# Patient Record
(demographics unavailable — no encounter records)

---
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
## 2025-03-29 NOTE — IMAGING
[Right] : right shoulder [Fracture] : Fracture [FreeTextEntry3] : displaced midshaft clavicle fracture

## 2025-03-29 NOTE — DISCUSSION/SUMMARY
[de-identified] : General Dx Discussion The patient was advised of the diagnosis. The natural history of the pathology was explained in full to the patient in layman's terms. All questions were answered. The risks and benefits of surgical and non-surgical treatment alternatives were explained in full to the patient.  x-rays findings reviewed with patient and her parents. Advised surgery is recommended. She will continue in sling at all times.  Tylenol as needed for pain.  She will see Dr. Gamble this monday to discuss and schedule surgery. Advised if skin breaks open she should go directly to the ER for further evaluation.  All questions answered and they had a clear understanding.

## 2025-03-29 NOTE — PHYSICAL EXAM
[Right] : right shoulder [] : tenderness at clavicle fracture site [FreeTextEntry3] : No Skin Tenting/opening [FreeTextEntry8] : NVI

## 2025-03-31 NOTE — DISCUSSION/SUMMARY
[de-identified] : Plan: Patient understands that She is a candidate for acute intervention to repair a RIGHT CLAVICLE FRACTURE in form of RIGHT CLAVICLE ORIF . Discussed the risks and benefits of the procedure. Discussed non-operative and operative treatment options. All questions and concerns regarding the surgery were addressed. Went over the recovery timeline and expected outcomes following surgery. Patient elected to move forward with the surgical procedure.  WILL OBTAIN STAT CT CHEST TO EVAL FOR PNEUMOTHORAX PREOP  Consent:  Conservative treatment, nontreatment, nonsurgical intervention and surgical intervention treatment options have been reviewed with the patient.  The patient continues to be symptomatic and has failed conservative treatment, and elects to move forward with surgical intervention.  The patient is indicated for [the above procedure] and all indicated procedures. As such the alternatives, benefits and risks, of the above procedure, including but not limited to bleeding, infection, neurovascular injury, loss of limb, loss of life,  DVT, PE, RSD, inability to return to previous level of activity, inability to return to previous level of employment, advancement of or to osteoarthritic changes, joint instability or motion loss, hardware failure or migration, malunion or nonunion, failure to resolve all symptoms, failure to return to sports and need for further procedures were discussed with the patient and/or their legal guardian who agreed to move forward with surgical intervention.  They have reviewed and signed the consent form today after expressing understanding of the above documented conversation. The patient or their representative will contact my office as instructed on the preoperative instruction sheet they received today to schedule surgery in a timely manner as discussed.    Tests Ordered: Pre-op and medical clearance Follow-Up: 2 weeks post-op   The patient's current medication management of their orthopedic diagnosis was reviewed today: (1) We discussed a comprehensive treatment plan that included possible pharmaceutical management involving the use of prescription strength medications including but not limited to options such as oral Naprosyn 500mg BID, once daily Meloxicam 15 mg, or 500-650 mg Tylenol versus over the counter oral medications and topical prescription NSAID Pennsaid vs over the counter Voltaren gel.   (2) There is a moderate risk of morbidity with further treatment, especially from use of prescription strength medications and possible side effects of these medications which include upset stomach with oral medications, skin reactions to topical medications and cardiac/renal issues with long term use.   (3) I recommended that the patient follow-up with their medical physician to discuss any significant specific potential issues with long term medication use such as interactions with current medications or with exacerbation of underlying medical comorbidities.   (4) The benefits and risks associated with use of injectable, oral or topical, prescription and over the counter anti-inflammatory medications were discussed with the patient. The patient voiced understanding of the risks including but not limited to bleeding, stroke, kidney dysfunction, heart disease, and were referred to the black box warning label for further information.
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
## 2025-03-31 NOTE — IMAGING
[de-identified] : No erythema, no discharge, no increased warmth to touch. ROM deferred due to fx status, strength testing deferred due to fx status. Distally neurovascularly intact with median, radial, ulnar, MSC, axillary nerves intact. 2+ radial pulse with capillary refill <2 seconds. Able to make fist, oppose thumb to small finger and abduct fingers. Sensation intact in median, ulnar and superficial radial.   X-rays of the RIGHT CLAVICLE show >100% displacement midshaft fracture with shortening

## 2025-04-10 NOTE — PHYSICAL EXAM
[de-identified] : No erythema, no discharge, no increased warmth to touch. ROM deferred due to fx status, strength testing deferred due to fx status. Distally neurovascularly intact with median, radial, ulnar, MSC, axillary nerves intact. 2+ radial pulse with capillary refill <2 seconds. Able to make fist, oppose thumb to small finger and abduct fingers. Sensation intact in median, ulnar and superficial radial.   X-rays of the RIGHT CLAVICLE taken today show hardware in place

## 2025-04-10 NOTE — PHYSICAL EXAM
[de-identified] : No erythema, no discharge, no increased warmth to touch. ROM deferred due to fx status, strength testing deferred due to fx status. Distally neurovascularly intact with median, radial, ulnar, MSC, axillary nerves intact. 2+ radial pulse with capillary refill <2 seconds. Able to make fist, oppose thumb to small finger and abduct fingers. Sensation intact in median, ulnar and superficial radial.   X-rays of the RIGHT CLAVICLE taken today show hardware in place
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
## 2025-04-10 NOTE — DISCUSSION/SUMMARY
[de-identified] : The patient is approximately 2 weeks postoperative. S/p right clavicle ORIF DOS: 04/01/2025  The patient is instructed in wound management. The patient's post-op plan, protocol and activity modifications have been thoroughly discussed and the patient expressed understanding. The patient will control pain as discussed & continue ice and elevation as needed. The patient otherwise may advance activity as discussed.  Prescription Medications Ordered: [None]   Physical Therapy: No Physical Therapy at this time   Braces/DME Ordered: Continue Postop Sling   Activity/Work/Sports Status: Out of gym/sports   Follow-Up: [4 weeks]

## 2025-04-10 NOTE — DISCUSSION/SUMMARY
[de-identified] : The patient is approximately 2 weeks postoperative. S/p right clavicle ORIF DOS: 04/01/2025  The patient is instructed in wound management. The patient's post-op plan, protocol and activity modifications have been thoroughly discussed and the patient expressed understanding. The patient will control pain as discussed & continue ice and elevation as needed. The patient otherwise may advance activity as discussed.  Prescription Medications Ordered: [None]   Physical Therapy: No Physical Therapy at this time   Braces/DME Ordered: Continue Postop Sling   Activity/Work/Sports Status: Out of gym/sports   Follow-Up: [4 weeks]
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
## 2025-05-12 NOTE — PHYSICAL EXAM
[de-identified] : No erythema, no discharge, no increased warmth to touch. ROM full, strength testing deferred due to fx status. Distally neurovascularly intact with median, radial, ulnar, MSC, axillary nerves intact. 2+ radial pulse with capillary refill <2 seconds. Able to make fist, oppose thumb to small finger and abduct fingers. Sensation intact in median, ulnar and superficial radial.   X-rays of the RIGHT CLAVICLE taken today show hardware in place and anatomic alignment with some callus formation and healing present
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
## 2025-05-12 NOTE — DISCUSSION/SUMMARY
[de-identified] : The patient is approximately 6 weeks postoperative.  s/p right clavicle ORIF DOS: 04/01/2025  Incision(s) appear to be healing well. The patient is instructed in wound management. The patient's post-op plan, protocol and activity modifications have been thoroughly discussed and the patient expressed understanding. The patient will control pain as discussed & continue ice and elevation as needed. The patient otherwise may advance activity as discussed.    Prescription Medications Ordered: [None]   Physical Therapy: [continue home exercise program]   Braces/DME Ordered: [No brace needed any longer]   Activity/Work/Sports Status: [Continue out of work/gym/sports]   Follow-Up: [8 weeks wiht new XR]

## 2025-05-12 NOTE — PHYSICAL EXAM
[de-identified] : No erythema, no discharge, no increased warmth to touch. ROM full, strength testing deferred due to fx status. Distally neurovascularly intact with median, radial, ulnar, MSC, axillary nerves intact. 2+ radial pulse with capillary refill <2 seconds. Able to make fist, oppose thumb to small finger and abduct fingers. Sensation intact in median, ulnar and superficial radial.   X-rays of the RIGHT CLAVICLE taken today show hardware in place and anatomic alignment with some callus formation and healing present

## 2025-05-12 NOTE — DISCUSSION/SUMMARY
[de-identified] : The patient is approximately 6 weeks postoperative.  s/p right clavicle ORIF DOS: 04/01/2025  Incision(s) appear to be healing well. The patient is instructed in wound management. The patient's post-op plan, protocol and activity modifications have been thoroughly discussed and the patient expressed understanding. The patient will control pain as discussed & continue ice and elevation as needed. The patient otherwise may advance activity as discussed.    Prescription Medications Ordered: [None]   Physical Therapy: [continue home exercise program]   Braces/DME Ordered: [No brace needed any longer]   Activity/Work/Sports Status: [Continue out of work/gym/sports]   Follow-Up: [8 weeks wiht new XR]
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
## 2025-05-12 NOTE — REASON FOR VISIT
[Parent] : parent [Family Member] : family member [FreeTextEntry2] : POV #2 s/p right clavicle ORIF  DOS: 04/01/2025

## 2025-05-12 NOTE — PHYSICAL EXAM
[de-identified] : No erythema, no discharge, no increased warmth to touch. ROM full, strength testing deferred due to fx status. Distally neurovascularly intact with median, radial, ulnar, MSC, axillary nerves intact. 2+ radial pulse with capillary refill <2 seconds. Able to make fist, oppose thumb to small finger and abduct fingers. Sensation intact in median, ulnar and superficial radial.   X-rays of the RIGHT CLAVICLE taken today show hardware in place and anatomic alignment with some callus formation and healing present

## 2025-05-12 NOTE — DISCUSSION/SUMMARY
[de-identified] : The patient is approximately 6 weeks postoperative.  s/p right clavicle ORIF DOS: 04/01/2025  Incision(s) appear to be healing well. The patient is instructed in wound management. The patient's post-op plan, protocol and activity modifications have been thoroughly discussed and the patient expressed understanding. The patient will control pain as discussed & continue ice and elevation as needed. The patient otherwise may advance activity as discussed.    Prescription Medications Ordered: [None]   Physical Therapy: [continue home exercise program]   Braces/DME Ordered: [No brace needed any longer]   Activity/Work/Sports Status: [Continue out of work/gym/sports]   Follow-Up: [8 weeks wiht new XR]

## 2025-07-10 NOTE — REASON FOR VISIT
[Family Member] : family member [FreeTextEntry2] : Follow up s/p right clavicle ORIF  DOS: 04/01/2025

## 2025-07-10 NOTE — DISCUSSION/SUMMARY
[de-identified] : The patient is approximately 3 months postoperative.  s/p right clavicle ORIF DOS: 04/01/2025  Incision(s) appear to be healing well. The patient is instructed in wound management. The patient's post-op plan, protocol and activity modifications have been thoroughly discussed and the patient expressed understanding. The patient will control pain as discussed & continue ice and elevation as needed. The patient otherwise may advance activity as discussed.    Prescription Medications Ordered: [None]   Physical Therapy: [continue home exercise program]   Braces/DME Ordered: [No brace needed any longer]   Activity/Work/Sports Status: [return to activities as tolerated]   Follow-Up: [3 months with new XR]

## 2025-07-10 NOTE — PHYSICAL EXAM
[de-identified] : No erythema, no discharge, no increased warmth to touch. ROM full, strength testing deferred due to fx status. Distally neurovascularly intact with median, radial, ulnar, MSC, axillary nerves intact. 2+ radial pulse with capillary refill <2 seconds. Able to make fist, oppose thumb to small finger and abduct fingers. Sensation intact in median, ulnar and superficial radial.   X-rays of the RIGHT CLAVICLE taken today show hardware in place and anatomic alignment with some callus formation and healing present
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
